# Patient Record
Sex: MALE | Race: ASIAN | NOT HISPANIC OR LATINO | URBAN - METROPOLITAN AREA
[De-identification: names, ages, dates, MRNs, and addresses within clinical notes are randomized per-mention and may not be internally consistent; named-entity substitution may affect disease eponyms.]

---

## 2018-04-09 ENCOUNTER — EMERGENCY (EMERGENCY)
Facility: HOSPITAL | Age: 28
LOS: 1 days | Discharge: HOME | End: 2018-04-09
Attending: EMERGENCY MEDICINE | Admitting: EMERGENCY MEDICINE
Payer: COMMERCIAL

## 2018-04-09 VITALS
TEMPERATURE: 99 F | DIASTOLIC BLOOD PRESSURE: 79 MMHG | SYSTOLIC BLOOD PRESSURE: 123 MMHG | OXYGEN SATURATION: 98 % | HEART RATE: 104 BPM | RESPIRATION RATE: 18 BRPM

## 2018-04-09 VITALS
OXYGEN SATURATION: 100 % | HEART RATE: 105 BPM | TEMPERATURE: 98 F | DIASTOLIC BLOOD PRESSURE: 78 MMHG | SYSTOLIC BLOOD PRESSURE: 115 MMHG | RESPIRATION RATE: 18 BRPM

## 2018-04-09 PROCEDURE — 99283 EMERGENCY DEPT VISIT LOW MDM: CPT

## 2018-04-09 NOTE — ED ADULT TRIAGE NOTE - ESI TRIAGE ACUITY LEVEL, MLM
"[facility]  Brief Operative Note     SUMMARY     Surgery Date: 3/15/2018     Surgeon(s) and Role:     * Gian Cárdenas MD - Primary     * Emilia Ragland MD    Assisting Surgeon: None    Pre-op Diagnosis:  Lower limb length difference [M21.70]    Post-op Diagnosis:  Post-Op Diagnosis Codes:     * Lower limb length difference [M21.70]    Procedure(s) (LRB):  EPIPHYSIODESIS, right distal femur.  Orthopediatrics Pediplates. (Right)    Anesthesia: General    Description of the findings of the procedure: see op note    Findings/Key Components: see op note    Estimated Blood Loss: * No values recorded between 3/15/2018  7:42 AM and 3/15/2018  8:44 AM *         Specimens:   Specimen (12h ago through future)    None          Discharge Note    SUMMARY     Admit Date: 3/15/2018    Discharge Date and Time:  03/15/2018 8:44 AM    Hospital Course (synopsis of major diagnoses, care, treatment, and services provided during the course of the hospital stay): Patient presented for above procedure.  Tolerated it well and was discharged home POD0 after voiding, tolerating diet, ambulating, pain controlled.  Discharge instructions, follow-up appointment, and med rec are below.       Final Diagnosis: Post-Op Diagnosis Codes:     * Lower limb length difference [M21.70]    Disposition: Home or Self Care    Follow Up/Patient Instructions:     Medications:  Reconciled Home Medications: There are no discharge medications for this patient.      Discharge Procedure Orders  CRUTCHES FOR HOME USE   Order Specific Question Answer Comments   Type: Axillary    Height: 54"    Weight: 33.7 kg (74 lb 3 oz)    Length of need (1-99 months): 99      Diet general     Call MD for:  temperature >100.4     Call MD for:  persistent nausea and vomiting     Call MD for:  severe uncontrolled pain     Call MD for:  difficulty breathing, headache or visual disturbances     Call MD for:  redness, tenderness, or signs of infection " (pain, swelling, redness, odor or green/yellow discharge around incision site)     Call MD for:  hives     Call MD for:  persistent dizziness or light-headedness     Call MD for:  extreme fatigue     Remove dressing in 72 hours       Follow-up Information     Gian Cárdenas MD In 2 weeks.    Specialty:  Pediatric Orthopedic Surgery  Why:  For wound re-check  Contact information:  [address]  [facility] [address]  [phone]                  4
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
## 2018-04-09 NOTE — ED PROVIDER NOTE - PROGRESS NOTE DETAILS
feeling better sx resolved with irrigation, stable for dc home, recc artificial tears PRN, eye clinic followup.
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
## 2018-04-13 DIAGNOSIS — H57.13 OCULAR PAIN, BILATERAL: ICD-10-CM

## 2018-04-13 DIAGNOSIS — H10.9 UNSPECIFIED CONJUNCTIVITIS: ICD-10-CM
